# Patient Record
Sex: MALE | Race: WHITE | Employment: OTHER | URBAN - METROPOLITAN AREA
[De-identification: names, ages, dates, MRNs, and addresses within clinical notes are randomized per-mention and may not be internally consistent; named-entity substitution may affect disease eponyms.]

---

## 2022-03-22 ENCOUNTER — APPOINTMENT (OUTPATIENT)
Dept: GENERAL RADIOLOGY | Age: 62
End: 2022-03-22
Payer: MEDICAID

## 2022-03-22 ENCOUNTER — HOSPITAL ENCOUNTER (EMERGENCY)
Age: 62
Discharge: NURSING, REHAB OR LONG-TERM CARE | End: 2022-03-23
Attending: EMERGENCY MEDICINE
Payer: MEDICAID

## 2022-03-22 DIAGNOSIS — S80.812A LEG ABRASION, LEFT, INITIAL ENCOUNTER: Primary | ICD-10-CM

## 2022-03-22 LAB
ALBUMIN SERPL-MCNC: 2.9 G/DL (ref 3.4–5)
ALP BLD-CCNC: 217 U/L (ref 40–129)
ALT SERPL-CCNC: 42 U/L (ref 10–40)
AMMONIA: 35 UMOL/L (ref 16–60)
ANION GAP SERPL CALCULATED.3IONS-SCNC: 8 MMOL/L (ref 3–16)
AST SERPL-CCNC: 90 U/L (ref 15–37)
BASOPHILS ABSOLUTE: 0.1 K/UL (ref 0–0.2)
BASOPHILS RELATIVE PERCENT: 1.2 %
BILIRUB SERPL-MCNC: 0.6 MG/DL (ref 0–1)
BILIRUBIN DIRECT: <0.2 MG/DL (ref 0–0.3)
BILIRUBIN URINE: NEGATIVE
BILIRUBIN, INDIRECT: ABNORMAL MG/DL (ref 0–1)
BLOOD, URINE: NEGATIVE
BUN BLDV-MCNC: 16 MG/DL (ref 7–20)
CALCIUM SERPL-MCNC: 8.8 MG/DL (ref 8.3–10.6)
CHLORIDE BLD-SCNC: 107 MMOL/L (ref 99–110)
CLARITY: CLEAR
CO2: 22 MMOL/L (ref 21–32)
COLOR: YELLOW
CREAT SERPL-MCNC: 1 MG/DL (ref 0.8–1.3)
EOSINOPHILS ABSOLUTE: 0.2 K/UL (ref 0–0.6)
EOSINOPHILS RELATIVE PERCENT: 3.4 %
GFR AFRICAN AMERICAN: >60
GFR NON-AFRICAN AMERICAN: >60
GLUCOSE BLD-MCNC: 95 MG/DL (ref 70–99)
GLUCOSE URINE: NEGATIVE MG/DL
HCT VFR BLD CALC: 26.8 % (ref 40.5–52.5)
HEMOGLOBIN: 8.7 G/DL (ref 13.5–17.5)
KETONES, URINE: NEGATIVE MG/DL
LACTIC ACID: 1 MMOL/L (ref 0.4–2)
LEUKOCYTE ESTERASE, URINE: NEGATIVE
LIPASE: 37 U/L (ref 13–60)
LYMPHOCYTES ABSOLUTE: 0.8 K/UL (ref 1–5.1)
LYMPHOCYTES RELATIVE PERCENT: 13.3 %
MCH RBC QN AUTO: 35.2 PG (ref 26–34)
MCHC RBC AUTO-ENTMCNC: 32.6 G/DL (ref 31–36)
MCV RBC AUTO: 108.1 FL (ref 80–100)
MICROSCOPIC EXAMINATION: NORMAL
MONOCYTES ABSOLUTE: 0.4 K/UL (ref 0–1.3)
MONOCYTES RELATIVE PERCENT: 7.3 %
NEUTROPHILS ABSOLUTE: 4.3 K/UL (ref 1.7–7.7)
NEUTROPHILS RELATIVE PERCENT: 74.8 %
NITRITE, URINE: NEGATIVE
PDW BLD-RTO: 17.8 % (ref 12.4–15.4)
PH UA: 6.5 (ref 5–8)
PLATELET # BLD: 181 K/UL (ref 135–450)
PMV BLD AUTO: 9.1 FL (ref 5–10.5)
POTASSIUM REFLEX MAGNESIUM: 5.3 MMOL/L (ref 3.5–5.1)
PRO-BNP: 1364 PG/ML (ref 0–124)
PROTEIN UA: NEGATIVE MG/DL
RBC # BLD: 2.48 M/UL (ref 4.2–5.9)
SODIUM BLD-SCNC: 137 MMOL/L (ref 136–145)
SPECIFIC GRAVITY UA: 1.02 (ref 1–1.03)
TOTAL PROTEIN: 6.8 G/DL (ref 6.4–8.2)
TROPONIN: <0.01 NG/ML
URINE REFLEX TO CULTURE: NORMAL
URINE TYPE: NORMAL
UROBILINOGEN, URINE: 1 E.U./DL
WBC # BLD: 5.7 K/UL (ref 4–11)

## 2022-03-22 PROCEDURE — 84484 ASSAY OF TROPONIN QUANT: CPT

## 2022-03-22 PROCEDURE — 83880 ASSAY OF NATRIURETIC PEPTIDE: CPT

## 2022-03-22 PROCEDURE — 93005 ELECTROCARDIOGRAM TRACING: CPT | Performed by: EMERGENCY MEDICINE

## 2022-03-22 PROCEDURE — 80048 BASIC METABOLIC PNL TOTAL CA: CPT

## 2022-03-22 PROCEDURE — 85025 COMPLETE CBC W/AUTO DIFF WBC: CPT

## 2022-03-22 PROCEDURE — 83690 ASSAY OF LIPASE: CPT

## 2022-03-22 PROCEDURE — 71046 X-RAY EXAM CHEST 2 VIEWS: CPT

## 2022-03-22 PROCEDURE — 82140 ASSAY OF AMMONIA: CPT

## 2022-03-22 PROCEDURE — 36415 COLL VENOUS BLD VENIPUNCTURE: CPT

## 2022-03-22 PROCEDURE — 81003 URINALYSIS AUTO W/O SCOPE: CPT

## 2022-03-22 PROCEDURE — 83605 ASSAY OF LACTIC ACID: CPT

## 2022-03-22 PROCEDURE — 80076 HEPATIC FUNCTION PANEL: CPT

## 2022-03-22 PROCEDURE — 99285 EMERGENCY DEPT VISIT HI MDM: CPT

## 2022-03-22 PROCEDURE — 6370000000 HC RX 637 (ALT 250 FOR IP): Performed by: EMERGENCY MEDICINE

## 2022-03-22 RX ORDER — BACITRACIN ZINC AND POLYMYXIN B SULFATE 500; 1000 [USP'U]/G; [USP'U]/G
OINTMENT TOPICAL ONCE
Status: COMPLETED | OUTPATIENT
Start: 2022-03-22 | End: 2022-03-22

## 2022-03-22 RX ADMIN — BACITRACIN ZINC AND POLYMYXIN B SULFATE: 500; 10000 OINTMENT TOPICAL at 22:34

## 2022-03-22 NOTE — Clinical Note
Patient discharged per MD. Patient picked up by EMS at this time. All paper work reviewed with patient and given to EMS personal to be provided to nursing care facility. Report previously called to nursing care facility by charge nurse. Patient alert wit h confusion at discharge. Vital signs completed.

## 2022-03-23 VITALS
DIASTOLIC BLOOD PRESSURE: 89 MMHG | SYSTOLIC BLOOD PRESSURE: 151 MMHG | TEMPERATURE: 97.7 F | OXYGEN SATURATION: 98 % | HEIGHT: 73 IN | RESPIRATION RATE: 18 BRPM | BODY MASS INDEX: 35.52 KG/M2 | WEIGHT: 268 LBS | HEART RATE: 70 BPM

## 2022-03-23 LAB
EKG ATRIAL RATE: 62 BPM
EKG DIAGNOSIS: NORMAL
EKG P AXIS: 53 DEGREES
EKG P-R INTERVAL: 136 MS
EKG Q-T INTERVAL: 412 MS
EKG QRS DURATION: 76 MS
EKG QTC CALCULATION (BAZETT): 418 MS
EKG R AXIS: -20 DEGREES
EKG T AXIS: 26 DEGREES
EKG VENTRICULAR RATE: 62 BPM

## 2022-03-23 NOTE — ED NOTES
Called [facility] and spoke with BODØ RN who was previously caring for patient. Aware patient coming back to facility as soon as transport arrives.       Migdalia Velazquez RN  03/22/22 3764

## 2022-03-23 NOTE — ED NOTES
Patient ambulated to restroom with cane and standby assist. [employer] heated up for patient at this time per request. Pending pickup at [address]  03/22/22 0825

## 2022-03-23 NOTE — ED NOTES
Patient resting in bed at this time. Door open, pending transportation pick-up.      Doreen Ca RN  03/22/22 8999

## 2022-03-23 NOTE — FLOWSHEET NOTE
Ams, Patient was dropped off at EMS station by [facility], Resident of nursing home.  Daughter told EMS patient has AMS, however AMS at baseline

## 2022-03-23 NOTE — ED PROVIDER NOTES
[id number]          ATTENDING PHYSICIAN NOTE       Date of evaluation: 3/22/2022    Chief Complaint     Altered Mental Status      History of Present Illness     Surinder Adame is a 64 y.o. male who presents with chief complaint of altered mental status. EMS states that the patient got dropped off at their facility, states that they were told he had altered mental status although they were also told that this is baseline for him. The patient states that he left his nursing home because he did not like the people there. Review of the medical record demonstrates that the patient was just discharged from [facility] on March 17 after being treated for a wrist infection. He was discharged to [facility]. The patient states his wrist is much improved. He reports only some dizziness over the last few days that he describes as his brain feeling swimmy otherwise feels his normal self. Dizziness mentioned as an after thought after much questioning and not volunteered is an acute symptom. He has no weakness in his extremities, no difficulty with speech, no change in sensation. He denies abdominal pain, chest pain, shortness of breath. Denies vomiting and diarrhea. States he has been taking good p.o. intake. No fever or URI symptoms. Has superficial laceration to his left lower leg after falling in the parking lot at [facility]. I asked him where he plan to stay and he states that he could probably stay with his sister for short period of time. Review of Systems     Negative for fever, vomiting, chest pain, shortness of breath, abdominal pain. All other systems were reviewed and are negative, except as mentioned in HPI. Past Medical, Surgical, Family, and Social History     He has no past medical history on file. He has no past surgical history on file. His family history is not on file.   He     Medications     Previous Medications    No medications on file Allergies     He has No Known Allergies. Physical Exam     INITIAL VITALS: BP: (!) 150/82, Temp: 97.2 °F (36.2 °C), Pulse: 68, Resp: 17, SpO2: 100 %       General:  Well appearing. No acute distress. Eyes:  Pupils reactive. No discharge from eyes. ENT:  No discharge from nose. Neck:  Supple. Pulmonary:   Non-labored breathing. Breath sounds clear bilaterally. Cardiac:  Regular rate and rhythm. No murmurs. Abdomen:  Soft. Non-tender. Non-distended. Musculoskeletal:  No CVA tenderness. Skin:  No rash. Neuro:  Alert and oriented x4. Moves all four extremities to command. No focal deficit. Extremities:  Warm and perfused. No peripheral edema. Diagnostic Results     LABS:   Please see electronic medical record for laboratory tests performed in the ED. RADIOLOGY:  Please see electronic medical record for imaging studies performed in the ED. EKG   Please see medical record for specific interval measurements. Impression: Normal sinus rhythm at 62 bpm, normal intervals, left axis deviation, no acute ST or T wave changes, no old for comparison      RECENT VITALS:  BP: (!) 143/92, Temp: 97.7 °F (36.5 °C), Pulse: 74, Resp: 17     Procedures         ED Course     Nursing Notes, Past Medical Hx, Past Surgical Hx, Social Hx, Allergies, and Family Hx were reviewed. The patient was given the following medications:  Orders Placed This Encounter   Medications    bacitracin-polymyxin b (POLYSPORIN) ointment       CONSULTS:  None    MEDICAL DECISION MAKING / ASSESSMENT / Phoebe Derian is a 64 y.o. male presenting after leaving his nursing home without complaints. EKG unremarkable. CBC with white count of 5.7 and hemoglobin of 8.7 (this is baseline). Lactate normal.  Urinalysis negative. Renal panel with normal electrolytes and renal function. LFTs unremarkable. Ammonia negative. Lipase negative. Troponin negative.   proBNP mildly elevated although the patient does not have any shortness of breath or chest pain no pulmonary edema on chest x-ray. Chest x-ray limited due to expiratory film. The patient has abrasions to bilateral lower extremities that he sustained when he tripped over a pothole outside the nursing home. These were cleaned and bacitracin Adaptic applied. At this point the patient is stable for discharge home. His sister cannot take care of him and so the patient will go back to his nursing home where he still has a room available. Return precautions given. Clinical Impression     1.  Leg abrasion, left, initial encounter        Disposition     PATIENT REFERRED TO:  [facility] Emergency Department  [address]  [address]  [phone]    If symptoms worsen      DISCHARGE MEDICATIONS:  New Prescriptions    No medications on file       DISPOSITION Decision To Discharge 03/22/2022 09:56:11 PM            Zia Tello MD  03/22/22 3859

## 2022-03-23 NOTE — ED NOTES
Patient resting in bed at this time, pending lab results for disposition. No current needs.       Rocael Saravia RN  03/22/22 4346

## 2022-04-15 ENCOUNTER — CLINICAL DOCUMENTATION (OUTPATIENT)
Dept: OTHER | Age: 62
End: 2022-04-15

## 2022-05-24 ENCOUNTER — TELEPHONE (OUTPATIENT)
Dept: ORTHOPEDIC SURGERY | Age: 62
End: 2022-05-24

## 2022-05-24 NOTE — TELEPHONE ENCOUNTER
Attempted to contact patient to inform them about the [facility] [address] joint pain program.    To provide optimal care, [facility] [address], in collaboration with our Primary Care Providers, are excited to update our [facility] ([facility]) patients on our joint pain program.    In the past have you received injections in your knees? Or are you currently experiencing knee pain that is impacting your daily activities or quality of life? For more information about what [facility] [address] can offer to you or to set up an appointment with a joint pain specialist, please call us back at [phone].

## 2025-08-20 DIAGNOSIS — H91.90 HEARING LOSS, UNSPECIFIED HEARING LOSS TYPE, UNSPECIFIED LATERALITY: Primary | ICD-10-CM

## 2025-08-28 ENCOUNTER — OFFICE VISIT (OUTPATIENT)
Dept: ENT CLINIC | Age: 65
End: 2025-08-28
Payer: MEDICAID

## 2025-08-28 ENCOUNTER — PROCEDURE VISIT (OUTPATIENT)
Dept: AUDIOLOGY | Age: 65
End: 2025-08-28
Payer: MEDICAID

## 2025-08-28 VITALS
BODY MASS INDEX: 37.11 KG/M2 | DIASTOLIC BLOOD PRESSURE: 85 MMHG | WEIGHT: 274 LBS | SYSTOLIC BLOOD PRESSURE: 146 MMHG | HEART RATE: 82 BPM | HEIGHT: 72 IN

## 2025-08-28 DIAGNOSIS — R42 DIZZINESS: ICD-10-CM

## 2025-08-28 DIAGNOSIS — H90.3 SENSORINEURAL HEARING LOSS (SNHL) OF BOTH EARS: Primary | ICD-10-CM

## 2025-08-28 DIAGNOSIS — R42 DIZZINESS AND GIDDINESS: ICD-10-CM

## 2025-08-28 DIAGNOSIS — H93.13 TINNITUS OF BOTH EARS: ICD-10-CM

## 2025-08-28 PROCEDURE — 92557 COMPREHENSIVE HEARING TEST: CPT

## 2025-08-28 PROCEDURE — 92567 TYMPANOMETRY: CPT

## 2025-08-28 PROCEDURE — G8428 CUR MEDS NOT DOCUMENT: HCPCS

## 2025-08-28 PROCEDURE — G8417 CALC BMI ABV UP PARAM F/U: HCPCS | Performed by: OTOLARYNGOLOGY

## 2025-08-28 PROCEDURE — 99203 OFFICE O/P NEW LOW 30 MIN: CPT | Performed by: OTOLARYNGOLOGY

## 2025-08-28 PROCEDURE — 1123F ACP DISCUSS/DSCN MKR DOCD: CPT | Performed by: OTOLARYNGOLOGY

## 2025-08-28 PROCEDURE — G8428 CUR MEDS NOT DOCUMENT: HCPCS | Performed by: OTOLARYNGOLOGY

## 2025-08-28 PROCEDURE — 3017F COLORECTAL CA SCREEN DOC REV: CPT | Performed by: OTOLARYNGOLOGY

## 2025-08-28 PROCEDURE — 4004F PT TOBACCO SCREEN RCVD TLK: CPT | Performed by: OTOLARYNGOLOGY
